# Patient Record
Sex: FEMALE | Race: BLACK OR AFRICAN AMERICAN
[De-identification: names, ages, dates, MRNs, and addresses within clinical notes are randomized per-mention and may not be internally consistent; named-entity substitution may affect disease eponyms.]

---

## 2019-12-15 NOTE — PHYS DOC
Past Medical History


Past Medical History:  No Pertinent History


Past Surgical History:  Tubal ligation


Alcohol Use:  None


Drug Use:  None





Adult General


Chief Complaint


Chief Complaint:  COUGH





HPI


HPI





Patient is a 34-year-old female who presents with complaint of productive cough 

and chest soreness. Patient states symptoms been present for close to a week 

now. She states that the cough is productive of green sputum. She denies any 

fever. She states that she is really sore in her lower ribs and her abdomen from

all the coughing. She denies any nausea or vomiting.[]





Review of Systems


Review of Systems





Constitutional: Denies fever or chills []


Respiratory: Complains of productive cough without shortness of breath []


Cardiovascular: No additional information not addressed in HPI []


GI: Denies abdominal pain, nausea, vomiting or diarrhea []





Allergies


Allergies





Allergies








Coded Allergies Type Severity Reaction Last Updated Verified


 


  No Known Drug Allergies    3/13/14 No











Physical Exam


Physical Exam





Constitutional: Well developed, well nourished, no acute distress, non-toxic 

appearance. []


Neck: Normal range of motion, no tenderness, supple, no stridor. [] 


Cardiovascular: Regular rate and rhythm[]


Lungs & Thorax: There are fine rhonchi noted to auscultation []


Extremities: No tenderness, no cyanosis, no clubbing, ROM intact, no edema. []





Current Patient Data


Vital Signs





                                   Vital Signs








  Date Time  Temp Pulse Resp B/P (MAP) Pulse Ox O2 Delivery O2 Flow Rate FiO2


 


12/15/19 15:25 98.5 99 18 130/69 (89) 97 Room Air  





 98.5       











EKG


EKG


[]





Radiology/Procedures


Radiology/Procedures


[]


Impressions:


Chest x-ray demonstrates no acute process.





Course & Med Decision Making


Course & Med Decision Making


Pertinent Labs and Imaging studies reviewed. (See chart for details)





[]





Dragon Disclaimer


Dragon Disclaimer


This electronic medical record was generated, in whole or in part, using a voice

 recognition dictation system.





Departure


Departure


Impression:  


   Primary Impression:  


   Acute bronchitis


Disposition:  01 HOME, SELF-CARE


Condition:  STABLE


Referrals:  


NO PCP (PCP)


Patient Instructions:  Acute Bronchitis


Scripts


Benzonatate (TESSALON PERLE) 100 Mg Capsule


1 CAP PO TID PRN for COUGH, #21 CAP


   Prov: DORIAN HERNANDEZ Jr. DO         12/15/19 


Azithromycin (ZITHROMAX) 250 Mg Tablet


1 PKG PO UD, #6 TAB


   Prov: DORIAN HERNANDEZ Jr. DO         12/15/19





Problem Qualifiers








   Primary Impression:  


   Acute bronchitis


   Bronchitis organism:  unspecified organism  Qualified Codes:  J20.9 - Acute 

   bronchitis, unspecified








DORIAN HERNANDEZ Jr. DO          Dec 15, 2019 15:37

## 2019-12-15 NOTE — RAD
PA and lateral chest.

 

HISTORY: Cough

 

PA and lateral views were taken of the chest. Lungs are clear. Heart is 

normal in size. There is no pleural effusion.

 

IMPRESSION:

1. No acute chest disease.

 

Electronically signed by: Steven Valdez MD (12/15/2019 4:29 PM) [id number]

## 2020-03-02 NOTE — PHYS DOC
Past Medical History


Past Medical History:  No Pertinent History


Past Surgical History:  Tubal ligation


Smoking Status:  Former Smoker


Alcohol Use:  None


Drug Use:  None





Adult General


Chief Complaint


Chief Complaint:  DENTAL PROBLEM





HPI


HPI





Patient is a 34  year old female who presents with 1 week of left upper and 

lower dental pain. Patient has many dental caries. States that she went to 

[facility] one month ago and they took x-rays and said that they can do

much for her because she has broken bones at the top of her mouth she has had 

for very long time and that she needs to go to a specialist. She states that she

called the number and left a message for the specialist dentist and they called 

back and told her that it could be $20-$25,000 to get her mouth 6. Patient rates

her pain a 9 out of 10. She denies fevers, nausea, vomiting, headache, body 

aches, facial swelling.





Review of Systems


Review of Systems








HENT: Denies nasal congestion or sore throat. Dental pain []








All other systems were reviewed and found to be within normal limits, except as 

documented in this note.





Allergies


Allergies





Allergies








Coded Allergies Type Severity Reaction Last Updated Verified


 


  No Known Drug Allergies    3/13/14 No











Physical Exam


Physical Exam





Constitutional: Well developed, well nourished, no acute distress, non-toxic 

appearance. []


HENT: Normocephalic, atraumatic, bilateral external ears normal, oropharynx 

moist, no oral exudates, nose normal. Many Dental caries all over mouth. []


Eyes: PERRLA, EOMI, conjunctiva normal, no discharge. [] 


Neck: Normal range of motion, no tenderness, supple, no stridor. [] 


Cardiovascular:Heart rate regular rhythm, no murmur []


Lungs & Thorax:  Bilateral breath sounds clear to auscultation []


Abdomen: Bowel sounds normal, soft, no tenderness, no masses, no pulsatile 

masses. [] 


Skin: Warm, dry, no erythema, no rash. [] 


Back: No tenderness, no CVA tenderness. [] 


Extremities: No tenderness, no cyanosis, no clubbing, ROM intact, no edema. [] 


Neurologic: Alert and oriented X 3, normal motor function, normal sensory 

function, no focal deficits noted. []


Psychologic: Affect normal, judgement normal, mood normal. []





Current Patient Data


Vital Signs





                                   Vital Signs








  Date Time  Temp Pulse Resp B/P (MAP) Pulse Ox O2 Delivery O2 Flow Rate FiO2


 


3/2/20 14:10 98.6 66 14 122/55 (77) 99 Room Air  





 98.6       











EKG


EKG


[]





Radiology/Procedures


Radiology/Procedures


[]





Course & Med Decision Making


Course & Med Decision Making


Pertinent Labs and Imaging studies reviewed. (See chart for details)





Has many dental caries all over her mouth. There is no gumline swelling or 

redness. No drainage and she is afebrile. She is told to keep calling the 

specialists and trying to get into his see a dentist. She states she's been 

trying Orajel and ibuprofen is not helping. Alert and oriented.  speaks in full 

clear sentences. No facial swelling. Patient states she's been eating and 

drinking appropriately. No trismus.











[]





Dragon Disclaimer


Dragon Disclaimer


This electronic medical record was generated, in whole or in part, using a voice

 recognition dictation system.





Departure


Departure


Impression:  


   Primary Impression:  


   Pain, dental


   Additional Impression:  


   Dental caries


Disposition:  01 HOME, SELF-CARE


Condition:  STABLE


Referrals:  


NO PCP (PCP)


Patient Instructions:  Dental Caries-Brief, Dental Pain, Easy-to-Read





Additional Instructions:  


Follow-up with dentist as soon as possible. Take medication as prescribed.


Scripts


Chlorhexidine Gluconate (PERIDEX) 15 Ml Mouthwash


15-30 ML PO TID for 8 Days, #473 ML 0 Refills


   Prov: LIBBY MILLER         3/2/20 


Hydrocodone/Apap 5-325 (NORCO 5-325 TABLET) 1 Each Tablet


1 TAB PO PRN Q6HRS PRN for PAIN, #10 TAB 0 Refills


   Prov: LIBBY MILLER         3/2/20





Problem Qualifiers











LIBBY MILLER             Mar 2, 2020 14:52

## 2020-03-31 NOTE — PHYS DOC
Past Medical History


Past Medical History:  No Pertinent History


Past Surgical History:  Tubal ligation


Smoking Status:  Never Smoker


Alcohol Use:  None


Drug Use:  None





Adult General


Chief Complaint


Chief Complaint:  SHORTNESS OF BREATH





HPI


HPI





Patient is a 34  year old female presents to the ED with a chief complaint of 

cough and shortness of breath.  Patient states that the symptoms started 

yesterday.  Patient states that her daughter current coronavirus pandemic she 

was concerned and came to the ER to see if she could get tested.  Patient denies

being an active smoker.  Patient denies any medical history.  Patient denies 

having a fever.





Review of Systems


Review of Systems





Patient denies fever, chills, nausea, vomiting, diarrhea, dysuria, chest pain, 

headache.  Patient does complain of cough and shortness of breath





All other systems were reviewed and found to be within normal limits, except as 

documented in this note.





Allergies


Allergies





Allergies








Coded Allergies Type Severity Reaction Last Updated Verified


 


  No Known Drug Allergies    3/13/14 No











Physical Exam


Physical Exam





Constitutional: Well developed, well nourished, no acute distress, non-toxic 

appearance. []


HENT: Normocephalic, atraumatic


Eyes: PERRLA, EOMI


Neck: Normal range of motio


Cardiovascular:Heart rate regular rhythm, no murmur []


Lungs & Thorax:  Bilateral breath sounds clear to auscultation []


Abdomen: Bowel sounds normal, soft, no tenderness


Extremities: No tenderness, no cyanosis, no clubbing, ROM intact, no edema. [] 


Neurologic: Alert and oriented X 3





Current Patient Data


Vital Signs





                                   Vital Signs








  Date Time  Temp Pulse Resp B/P (MAP) Pulse Ox O2 Delivery O2 Flow Rate FiO2


 


3/31/20 15:25 98.9 97 22 124/59 (80) 99 Room Air  





 98.9       











EKG


EKG


[]





Radiology/Procedures


Radiology/Procedures


[]





Course & Med Decision Making


Course & Med Decision Making





I told patient that we not testing patients for coronavirus unless they are 

being admitted to the hospital.  This is hospital policy.





I have offered to do chest x-ray and flu screening.





Patient states that she does not want any testing done.  She has only wanted to 

come to the ER to see if she can be tested for the coronavirus.


I have answered some questions that patient had about the coronavirus.


Patient is reassured and states that she will go home.





Patients oxygen sat is 99%.





Discussed  plan of care with patient.


Patient is instructed to follow up with PCP in one to 2 days.


Appropriate discharge instructions given to patient to return to the ED or to 

seek immediate medical evaluation.


Patient is instructed to return to the ED if symptoms worsen or if any concerns.





Dragon Disclaimer


Dragon Disclaimer


This electronic medical record was generated, in whole or in part, using a voice

 recognition dictation system.





Departure


Departure


Impression:  


   Primary Impression:  


   Viral URI with cough


Disposition:  01 HOME, SELF-CARE


Condition:  STABLE


Referrals:  


NO PCP (PCP)


Patient Instructions:  Upper Respiratory Infection, Adult





Additional Instructions:  





Patient is instructed to follow up with PCP in one to 2 days.


Appropriate discharge instructions given to patient to return to the ED or to 

seek immediate medical evaluation.


Patient is instructed to return to the ED if symptoms worsen or if any concerns.











ALCIDES PICKETT DO           Mar 31, 2020 15:46

## 2020-09-08 NOTE — RAD
Exam: Thoracic spine 2 views. Lumbar spine 2 views

 

INDICATION: Pain

 

TECHNIQUE: Frontal and lateral views of the thoracic and lumbar spine. 

Swimmer's view of the cervicothoracic junction and coned-down view of the 

lumbosacral junction were also reviewed.

 

Comparisons: None

 

FINDINGS:

 

Thoracic spine:

Vertebral body heights and alignment are well-maintained.

 

No significant spondylotic change in the thoracic spine.

 

Visualized soft tissues are unremarkable.

 

Lumbar spine:

Vertebral body heights and alignment are well-maintained.

 

Visualized soft tissues are unremarkable.

 

Mild facet arthropathy noted at the lower lumbar spine.

 

IMPRESSION:

1.  Unremarkable thoracic spine are dressed.

2.  Mild spondylotic changes lumbar spine.

 

Electronically signed by: Ciar Alatorre MD (9/8/2020 4:10 PM) [id number]

## 2020-09-08 NOTE — PHYS DOC
Past Medical History


Past Medical History:  No Pertinent History


Past Surgical History:  Tubal ligation


Smoking Status:  Never Smoker


Alcohol Use:  None


Drug Use:  None





General Adult


EDM:


Chief Complaint:  BACK PAIN OR INJURY





HPI:


HPI:





Patient is a 35  year old female who presents with a heavy metal rack fell on he

r back in May and she has had continued mid to lower back pain that worsens with

movement.  He states that she cannot get in with her work comp physician that 

her  is trying to get a hold the work comp physician.  She states that 

they want to send her to physical therapy but she cannot get a hold of anybody. 

She states that she has a court date on September 23.  She states that she ran 

out of tramadol yesterday and that is been helping her pain.  She currently 

rates her sharp aching pain 8 out of 10.





Review of Systems:


Review of Systems:


Constitutional:   Denies fever or chills. []


Eyes:   Denies change in visual acuity. []


HENT:   Denies nasal congestion or sore throat. [] 


Respiratory:   Denies cough or shortness of breath. [] 


Cardiovascular:   Denies chest pain or edema. [] 


GI:   Denies abdominal pain, nausea, vomiting, bloody stools or diarrhea. [] 


:  Denies dysuria. [] 


Musculoskeletal:   Mid to lower back pain or joint pain. [] 


Integument:   Denies rash. [] 


Neurologic:   Denies headache, focal weakness or sensory changes.  Intermittent 

right hand tingling or numbness.  [] 


Endocrine:   Denies polyuria or polydipsia. [] 


Lymphatic:  Denies swollen glands. [] 


Psychiatric:  Denies depression or anxiety. []





Heart Score:


Risk Factors:


Risk Factors:  DM, Current or recent (<one month) smoker, HTN, HLP, family 

history of CAD, obesity.


Risk Scores:


Score 0 - 3:  2.5% MACE over next 6 weeks - Discharge Home


Score 4 - 6:  20.3% MACE over next 6 weeks - Admit for Clinical Observation


Score 7 - 10:  72.7% MACE over next 6 weeks - Early Invasive Strategies





Allergies:


Allergies:





Allergies








Coded Allergies Type Severity Reaction Last Updated Verified


 


  No Known Drug Allergies    3/13/14 No











Physical Exam:


PE:





Constitutional: Well developed, well nourished, no acute distress, non-toxic 

appearance. []


HENT: Normocephalic, atraumatic, bilateral external ears normal, oropharynx 

moist, no oral exudates, nose normal. []


Eyes: PERRLA, EOMI, conjunctiva normal, no discharge. [] 


Neck: Normal range of motion, no tenderness, supple, no stridor. [] 


Cardiovascular:Heart rate regular rhythm, no murmur []


Lungs & Thorax:  Bilateral breath sounds clear to auscultation []


Abdomen: Bowel sounds normal, soft, no tenderness, no masses, no pulsatile 

masses. [] 


Skin: Warm, dry, no erythema, no rash. [] 


Back: Mid to upper lumbar focal bony spinal tenderness, no CVA tenderness. [] 


Extremities: No tenderness, no cyanosis, no clubbing, ROM intact, no edema. [] 


Neurologic: Alert and oriented X 3, normal motor function, normal sensory 

function, no focal deficits noted. []


Psychologic: Affect normal, judgement normal, mood normal. []





EKG:


EKG:


[]





Radiology/Procedures:


Radiology/Procedures:


[]





Course & Med Decision Making:


Course & Med Decision Making


Pertinent Labs and Imaging studies reviewed. (See chart for details)





See HPI.  Ambulatory with a steady gait.  No saddle paresthesia.  Speaks in full

 complete sentences.  Alert and oriented x4.  Patient states she only has 

numbness and tingling in her mid leg since the incident and her right hand.  

Patient denies any numbness and tingling in her bilateral lower extremities, 

loss of bowel bladder, dizziness, headache, fever, dysuria symptoms, chest pain,

 shortness of breath, abdominal pain, nausea, vomiting, diarrhea.  Skin pink 

warm and dry.  Radial pulses strong present.  Bilateral lower extremities and 

upper extremities have equal strengths and .  No extremity edema.  Patient 

states when her right hand intermittently goes numb or starts tingling that is 

when she loses some strength in the hand.  With palpation patient has focal bony

 spinal tenderness through the thoracic down into the upper lumbar areas.  There

 is no bruising, deformity, swelling seen or felt.  Neurologically intact.





Xrays read by Dr Vallejo as no acute findings. Patient to follow up with work 

compensation doctor. 





[]





Dragon Disclaimer:


Dragon Disclaimer:


This electronic medical record was generated, in whole or in part, using a voice

 recognition dictation system.





Departure


Departure


Impression:  


   Primary Impression:  


   Back pain


   Qualified Codes:  M54.5 - Low back pain


   Additional Impression:  


   Numbness and tingling in right hand


Disposition:  01 HOME, SELF-CARE


Condition:  STABLE


Referrals:  


NO PCP (PCP)


Patient Instructions:  Back Pain, Adult





Additional Instructions:  


Follow-up with the work comp physician as soon as possible.  Take medication as 

prescribed do not drink alcohol or do other drugs with this medication.


Scripts


Tramadol Hcl (TRAMADOL HCL) 50 Mg Tablet


50 MG PO Q6HRS PRN for PAIN, #10 TAB


   Prov: LIBBY MILLER         9/8/20





Justicifation of Admission Dx:


Justifications for Admission:


Justification of Admission Dx:  N/A











LIBBY MILLER             Sep 8, 2020 14:26

## 2020-09-08 NOTE — RAD
Exam: Thoracic spine 2 views. Lumbar spine 2 views

 

INDICATION: Pain

 

TECHNIQUE: Frontal and lateral views of the thoracic and lumbar spine. 

Swimmer's view of the cervicothoracic junction and coned-down view of the 

lumbosacral junction were also reviewed.

 

Comparisons: None

 

FINDINGS:

 

Thoracic spine:

Vertebral body heights and alignment are well-maintained.

 

No significant spondylotic change in the thoracic spine.

 

Visualized soft tissues are unremarkable.

 

Lumbar spine:

Vertebral body heights and alignment are well-maintained.

 

Visualized soft tissues are unremarkable.

 

Mild facet arthropathy noted at the lower lumbar spine.

 

IMPRESSION:

1.  Unremarkable thoracic spine are dressed.

2.  Mild spondylotic changes lumbar spine.

 

Electronically signed by: Cira Alatorre MD (9/8/2020 4:10 PM) [id number]

## 2021-05-28 VITALS — SYSTOLIC BLOOD PRESSURE: 128 MMHG | DIASTOLIC BLOOD PRESSURE: 68 MMHG

## 2021-05-28 NOTE — EKG
[facility]

              [address] [place] [address]

Test Date:    2021               Test Time:    07:43:31

Pat Name:     GRETA DESIR            Department:   

Patient ID:   [id number]           Room:          

Gender:       F                        Technician:   

:          [id number]               Requested By: ANGELA YOUSSEF

Order Number: [id number]           Reading MD:     

                                 Measurements

Intervals                              Axis          

Rate:         96                       P:            79

WV:           158                      QRS:          73

QRSD:         78                       T:            50

QT:           348                                    

QTc:          441                                    

                           Interpretive Statements

SINUS RHYTHM

NORMAL ECG

RI6.02

No previous ECG available for comparison

## 2021-05-28 NOTE — PHYS DOC
Past Medical History


Past Medical History:  No Pertinent History


Past Surgical History:  Tubal ligation


Smoking Status:  Current Every Day Smoker


Alcohol Use:  Rarely


Drug Use:  None





General Adult


EDM:


Chief Complaint:  SUBSTANCE ABUSE





HPI:


HPI:


35-year-old -American female presents the ED past medical history of 

former alcoholism, complaints of nausea, nonbloody nonbilious vomiting and 

epigastric abdominal pain that started a few hours prior to arrival.  Patient 

reports her sister was recently killed and she relapsed, drank a bottle of 

champagne by herself, last drink at 3 AM.  States her abdominal pain feels like 

her prior history of pancreatitis (6 yrs ago). Is crying, stating "my son was so

made at me," for drinking alcohol.  Takes no routinely prescribed medications.  

History of tubal ligation.  Denies any other drug use.  Denies any HI or SI.





Review of Systems:


Review of Systems:


Constitutional:   Denies fever or chills. []


Eyes:   Denies change in visual acuity. []


HENT:   Denies nasal congestion or sore throat. [] 


Respiratory:   Denies cough or shortness of breath. [] 


Cardiovascular:   Denies chest pain or edema. [] 


GI:   Denies bloody stools or diarrhea. [] 


:  Denies dysuria or vaginal bleeding 


Musculoskeletal:   Denies back pain or joint pain. [] 


Integument:   Denies rash or diaphoresis 


Neurologic:   Denies headache, focal weakness or sensory changes. [] 


Endocrine:   Denies polyuria or polydipsia. [] 


Lymphatic:  Denies swollen glands. [] 


Psychiatric:  Denies depression or anxiety. []





Heart Score:


C/O Chest Pain:  No


Risk Factors:


Risk Factors:  DM, Current or recent (<one month) smoker, HTN, HLP, family 

history of CAD, obesity.


Risk Scores:


Score 0 - 3:  2.5% MACE over next 6 weeks - Discharge Home


Score 4 - 6:  20.3% MACE over next 6 weeks - Admit for Clinical Observation


Score 7 - 10:  72.7% MACE over next 6 weeks - Early Invasive Strategies





Current Medications:





Current Medications








 Medications


  (Trade)  Dose


 Ordered  Sig/Guido  Start Time


 Stop Time Status Last Admin


Dose Admin


 


 Hydromorphone HCl


  (Dilaudid)  0.5 mg  1X  ONCE  21 07:45


 21 07:46 UNV  





 


 Metoclopramide HCl


  (Reglan Vial)  10 mg  1X  ONCE  21 07:45


 21 07:46 UNV  





 


 Ondansetron HCl


  (Zofran Odt)  4 mg  1X  ONCE  21 07:30


 21 07:31 DC  





 


 Sodium Chloride  1,000 ml @ 


 1,000 mls/hr  1X  ONCE  21 07:45


 21 08:44   














Allergies:


Allergies:





Allergies








Coded Allergies Type Severity Reaction Last Updated Verified


 


  No Known Drug Allergies    3/13/14 No











Physical Exam:


PE:





Constitutional: Well developed, well nourished, no acute distress, non-toxic 

appearance. 


HENT: Normocephalic, atraumatic, moist mucous membranes


Eyes: EOMI, conjunctiva normal, no discharge.  


Neck: Normal range of motion,  supple, 


Cardiovascular: S1/2 present, regular rhythm


Lungs & Thorax: Speaking in full sentences, bilateral equal chest rise, no 

tachypnea or increased work of breathing


Abdomen:  soft, epigastric ttp, no rigidity or guarding, skin flap/has lost 

weight, active yellow emesis in basin


Skin: Warm, dry, no erythema, no rash. [] 


Back: No tenderness, no CVA tenderness. [] 


Extremities: No tenderness, no cyanosis, no lower extremity edema


Neurologic: Alert and oriented X 3, normal motor function, normal sensory 

function, no focal deficits noted. []


Psychologic: Affect normal, judgement normal, mood-tearful/very hard on herself





Current Patient Data:


Labs:





                                Laboratory Tests








Test


 21


07:30


 


POC Urine HCG, Qualitative


 Hcg negative


(Negative)








Vital Signs:





                                   Vital Signs








  Date Time  Temp Pulse Resp B/P (MAP) Pulse Ox O2 Delivery O2 Flow Rate FiO2


 


21 07:38 98.4 90 22 126/75 (92) 100 Room Air  





 98.4       











EKG:


EKG:


sinus rhythm 96bpm, NAD, normal intervals, no T wave inversion, no ST elevations

 or ST depressions, no active chest pain





Radiology/Procedures:


Radiology/Procedures:


                                        


                                 IMAGING REPORT





                                     Signed





PATIENT: GRETA DESIR  ACCOUNT: [id number]     MRN#: [id number]


: 1985           LOCATION: ER              AGE: 35


SEX: F                    EXAM DT: 21         ACCESSION#: [id number]


STATUS: REG ER            ORD. PHYSICIAN: ANGELA YOUSSEF DO


REASON: epigastric pain, etoh use, h/o pancreatitis


PROCEDURE: CT ABD PELV W/ IV CONTRST ONLY








EXAM: Abdomen and pelvis CT with intravenous contrast.





HISTORY: Epigastric pain.





TECHNIQUE: Computed tomographic images of the abdomen and pelvis were obtained 

following the administration of intravenous contrast. Multiplanar reformatting 

was performed.





*One or more of the following individualized dose reduction techniques were 

utilized for this examination:  


1. Automated exposure control.  


2. Adjustment of the mA and/or kV according to patient size.  


3. Use of iterative reconstruction technique.





COMPARISON: 3/10/2018.





FINDINGS: Evaluation of the lower thorax demonstrates no infiltrate or pleural 

effusion. The heart is normal in size. There is mild fatty infiltration of the 

liver along the falciform ligament. No suspicious hepatic lesion is seen. The 

gallbladder is unremarkable. The pancreas, spleen, stomach and adrenal glands 

are unremarkable. The kidneys are unremarkable. There is no evidence of 

appendicitis. There is mild colonic wall thickening likely due to relative under

 distention. There is no convincing acute colitis. There is no bowel 

obstruction. The aorta is normal in caliber. The bladder, uterus and adnexal 

regions are unremarkable. There is no suspicious or acute osseous finding.





IMPRESSION: No convincing acute abdominal or pelvic finding.





Electronically signed by: Rosalie Valdez MD (2021 8:38 AM) [id number]














DICTATED and SIGNED BY:     ROSALIE VALDEZ MD


DATE:     21 [id number] 0





Course & Med Decision Making:


Course & Med Decision Making


Pertinent Labs and Imaging studies reviewed. (See chart for details)





Concern for nausea and vomiting w/epigastric abdominal pain. Normal lipase and 

pancreas (and aorta) on CT imaging. U/A c/w trichomonas infection. Patient with 

no leukocytosis, does not meet sepsis criteria. Urinalysis is contaminated with 

hematuria. Patient has had tubal ligation and is on her menses. 1 male 

unprotected sexual partner "baby aparna," 2 months ago.  Patient denies any 

abnormal vaginal discharge, itching or odor, lower abdominal pelvic or low back 

pain.  No associated fever, myalgias, nausea, vomiting or flulike symptoms.  

Patient was treated in the ED with antiemetics, Haldol for cannabis hyperemesis 

syndrome.  Abdominal pain and NBNB emesis resolved. GC self swab sent. Rocephin 

in ed, doxycycline and flagyl rx-educated to inform & treat all sexual partners.

  Will discharge home with strict ED return precautions were given for flulike 

symptoms, intractable nausea or vomiting, body aches, nausea, vomiting or 

worsening pain/abnormal vaginal discharge. Encouraged urgent outpatient follow-

up with PMD and OB/GYN for definitive management of STI (perform blood-borne 

testing).  Life-threatening processes were considered but are low suspicion at 

this time, given history, physical exam and ED workup. Pt was educated on all 

prescription medications and adverse effects.  All patient's questions were 

answered and pt was stable at time of discharge.





Life/limb-threatening differential includes but is not limited to, aortic 

dissection, aortic aneurysm, acute coronary syndrome, surgical abdomen 

(appendicitis, cholecystitis, ischemic bowel, strangulated hernia, etc), bowel 

obstruction or volvulus, bladder outlet obstruction, gastrointestinal bleeding, 

inflammatory bowel disease, peptic ulcer disease, ACS/CAD, sepsis, diverticular 

disease, ureterolithiasis,  nephrolithiasis, ovarian or testicular torsion, 

ectopic pregnancy, vaginal hemorrhage, or genitourinary infection.





I spoken with the patient and her caregivers.  I explained the patient's 

condition, diagnoses and treatment plan based on the information available to me

 at this time.  I have answered the patient and her caregiver's questions and 

addressed any concerns.  The patient and her caregivers have a good 

understanding of patient's diagnosis, condition and treatment plan as can be 

expected at this point.  Vital signs have been stable.  Patient's condition is 

stable and appropriate for discharge from the emergency department. 





Patient will pursue further outpatient evaluation with primary care physician or

 other designated or consulting physician as outlined in the discharge 

instructions.  The patient and/or caregivers are agreeable to this plan of care 

and follow-up instructions have been explained in detail.  The patient and/or 

caregivers have received these instructions in written form and have expressed 

an understanding of the discharge instructions.  The patient and/or caregivers 

are aware that any significant change of condition or worsening of symptoms 

should prompt immediate return to this or the closest emergency department or 

call to 911.





Jordan Disclaimer:


Dragon Disclaimer:


This electronic medical record was generated, in whole or in part, using a voice

 recognition dictation system.





Departure


Departure


Impression:  


   Primary Impression:  


   Nausea and vomiting


   Additional Impressions:  


   Abdominal pain


   Trichomonas infection


Disposition:  01 HOME / SELF CARE / HOMELESS


Condition:  STABLE


Referrals:  


NO PCP (PCP)


Patient Instructions:  Nausea and Vomiting, Safe Sex, Trichomoniasis





Additional Instructions:  


FOLLOW UP WITH  OB/GYN: For definitive management


[facility] Obstetrics and Gynecology


[address]


[place] [address]


Phone: [phone]





EMERGENCY DEPARTMENT GENERAL DISCHARGE INSTRUCTIONS





Thank you for coming to [facility] Emergency Department (ED) lina srinivasan and 


trusting us with you care.  We trust that you had a positive experience in our 

Emergency 


Department.  If you wish to speak to the department management, you may call the

 Director at 


[phone].





YOUR FOLLOW UP INSTRUCTIONS ARE AS FOLLOWS:





1.  Do you have a private Doctor?  If you do not have a private doctor, please 

ask for a 


resource list of physicians or clinics that may be able to assist you with 

follow up care.





2.  The Emergency Physicain has interpreted your x-rays.  The X-Ray specialist 

will also 


review them.  If there is a change in the findings, you will be notified in 48 

hours when at 


all possible.





3.  A lab test or culture has been done, your results will be reviewed and you 

will be 


notified if you need a change in treatment.





ADDITIONAL INSTRUCTIONS AND INFORMATION:





1.  Your care today has been supervised by a physician who is specially trained 

in emergency 


care.  Many problems require more than one evaluation for a complete diagnosis 

and 


treatment.  We recommend that you schedule your follow up appointment as 

recommended to 


ensure complete treatment of you illness or injury.  If you are unable to obtain

 follow up 


care and continue to have a problem, or if your condition worsens, we recommend 

that you 


return to the ED.





2.  We are not able to safely determine your condition over the phone nor are we

 able to 


give sound medical advice over the phone.  For these safety reasons, if you call

 for medical 


advice we will ask you to come to the ED for further evaluation.





3.  If you have any questions regarding these discharge instructions please call

 the ED at 


[phone].





SAFETY INFORMATION:





In the interest of safety, wellness, and injury prevention; we encourage you to 

wear your 


sealbelt, if you smoke; quite smoking, and we encourage family to use a 

protective helmet 


for bicycling and other sporting events that present an increased risk for head 

injury.





IF YOUR SYMPTOMS WORSEN OR NEW SYMPTOMS DEVELOP, OR YOU HAVE CONCERNS ABOUT YOUR

 CONDITION; 


OR IF YOUR CONDITION WORSENS WHILE YOU ARE WAITING FOR YOUR FOLLOW UP 

APPOINTMENT; EITHER 


CONTACT YOUR PRIMARY CARE DOCTOR, THE PHYSICIAN WHOSE NAME AND NUMBER YOU WERE 

GIVEN, OR 


RETURN TO THE ED IMMEDIATELY.


Scripts


Metronidazole (FLAGYL) 500 Mg Tablet


1 TAB PO BID for 14 Days, #28 TAB


   Prov: ANGELA YOUSSEF DO         21 


Ondansetron (ONDANSETRON ODT) 4 Mg Tab.rapdis


1 TAB PO PRN Q6-8HRS, #20 TAB


   Prov: ANGELA YOUSSEF DO         21 


Doxycycline Hyclate (DOXYCYCLINE HYCLATE) 100 Mg Capsule


1 CAP PO BID for 10 Days, #20 CAP


   Prov: ANGELA YOUSSEF DO         21











ANGELA YOUSSEF DO               May 28, 2021 07:52

## 2021-05-28 NOTE — RAD
EXAM: Abdomen and pelvis CT with intravenous contrast.



HISTORY: Epigastric pain.



TECHNIQUE: Computed tomographic images of the abdomen and pelvis were obtained following the administ
ration of intravenous contrast. Multiplanar reformatting was performed.



*One or more of the following individualized dose reduction techniques were utilized for this examina
tion:  

1. Automated exposure control.  

2. Adjustment of the mA and/or kV according to patient size.  

3. Use of iterative reconstruction technique.



COMPARISON: 3/10/2018.



FINDINGS: Evaluation of the lower thorax demonstrates no infiltrate or pleural effusion. The heart is
 normal in size. There is mild fatty infiltration of the liver along the falciform ligament. No suspi
cious hepatic lesion is seen. The gallbladder is unremarkable. The pancreas, spleen, stomach and adre
nal glands are unremarkable. The kidneys are unremarkable. There is no evidence of appendicitis. Ther
e is mild colonic wall thickening likely due to relative under distention. There is no convincing acu
te colitis. There is no bowel obstruction. The aorta is normal in caliber. The bladder, uterus and ad
nexal regions are unremarkable. There is no suspicious or acute osseous finding.



IMPRESSION: No convincing acute abdominal or pelvic finding.



Electronically signed by: Rosalie Narvaez MD (5/28/2021 8:38 AM) [id number]

## 2021-09-09 ENCOUNTER — HOSPITAL ENCOUNTER (EMERGENCY)
Dept: HOSPITAL 63 - ER | Age: 36
Discharge: HOME | End: 2021-09-09
Payer: COMMERCIAL

## 2021-09-09 VITALS — DIASTOLIC BLOOD PRESSURE: 97 MMHG | SYSTOLIC BLOOD PRESSURE: 116 MMHG

## 2021-09-09 VITALS — BODY MASS INDEX: 28.77 KG/M2 | HEIGHT: 66 IN | WEIGHT: 179.02 LBS

## 2021-09-09 DIAGNOSIS — J18.9: Primary | ICD-10-CM

## 2021-09-09 DIAGNOSIS — Z98.51: ICD-10-CM

## 2021-09-09 DIAGNOSIS — Z20.822: ICD-10-CM

## 2021-09-09 LAB
ANION GAP SERPL CALC-SCNC: 6 MMOL/L (ref 6–14)
BASOPHILS # BLD AUTO: 0.1 X10^3/UL (ref 0–0.2)
BASOPHILS NFR BLD: 1 % (ref 0–3)
CA-I SERPL ISE-MCNC: 12 MG/DL (ref 7–20)
CALCIUM SERPL-MCNC: 8.5 MG/DL (ref 8.5–10.1)
CHLORIDE SERPL-SCNC: 105 MMOL/L (ref 98–107)
CO2 SERPL-SCNC: 28 MMOL/L (ref 21–32)
CREAT SERPL-MCNC: 0.5 MG/DL (ref 0.6–1)
EOSINOPHIL NFR BLD: 0.2 X10^3/UL (ref 0–0.7)
EOSINOPHIL NFR BLD: 2 % (ref 0–3)
ERYTHROCYTE [DISTWIDTH] IN BLOOD BY AUTOMATED COUNT: 14.2 % (ref 11.5–14.5)
GFR SERPLBLD BASED ON 1.73 SQ M-ARVRAT: 169.9 ML/MIN
GLUCOSE SERPL-MCNC: 95 MG/DL (ref 70–99)
HCT VFR BLD CALC: 34.1 % (ref 36–47)
HGB BLD-MCNC: 10.9 G/DL (ref 12–15.5)
LYMPHOCYTES # BLD: 2.1 X10^3/UL (ref 1–4.8)
LYMPHOCYTES NFR BLD AUTO: 16 % (ref 24–48)
MCH RBC QN AUTO: 29 PG (ref 25–35)
MCHC RBC AUTO-ENTMCNC: 32 G/DL (ref 31–37)
MCV RBC AUTO: 90 FL (ref 79–100)
MONO #: 1.5 X10^3/UL (ref 0–1.1)
MONOCYTES NFR BLD: 11 % (ref 0–9)
NEUT #: 9.3 X10^3UL (ref 1.8–7.7)
NEUTROPHILS NFR BLD AUTO: 70 % (ref 31–73)
PLATELET # BLD AUTO: 367 X10^3/UL (ref 140–400)
POTASSIUM SERPL-SCNC: 4.2 MMOL/L (ref 3.5–5.1)
RBC # BLD AUTO: 3.81 X10^6/UL (ref 3.5–5.4)
SODIUM SERPL-SCNC: 139 MMOL/L (ref 136–145)
WBC # BLD AUTO: 13.3 X10^3/UL (ref 4–11)

## 2021-09-09 PROCEDURE — 80048 BASIC METABOLIC PNL TOTAL CA: CPT

## 2021-09-09 PROCEDURE — U0003 INFECTIOUS AGENT DETECTION BY NUCLEIC ACID (DNA OR RNA); SEVERE ACUTE RESPIRATORY SYNDROME CORONAVIRUS 2 (SARS-COV-2) (CORONAVIRUS DISEASE [COVID-19]), AMPLIFIED PROBE TECHNIQUE, MAKING USE OF HIGH THROUGHPUT TECHNOLOGIES AS DESCRIBED BY CMS-2020-01-R: HCPCS

## 2021-09-09 PROCEDURE — 85025 COMPLETE CBC W/AUTO DIFF WBC: CPT

## 2021-09-09 PROCEDURE — 71045 X-RAY EXAM CHEST 1 VIEW: CPT

## 2021-09-09 PROCEDURE — C9803 HOPD COVID-19 SPEC COLLECT: HCPCS

## 2021-09-09 PROCEDURE — 84484 ASSAY OF TROPONIN QUANT: CPT

## 2021-09-09 PROCEDURE — 99284 EMERGENCY DEPT VISIT MOD MDM: CPT

## 2021-09-09 NOTE — PHYS DOC
Past History


Past Surgical History:  Tubal ligation


Alcohol Use:  None





General Adult


EDM:


Chief Complaint:  COUGH





HPI:


HPI:


Patient is a 36-year-old female who presents to the ER for a yellow productive 

cough, nasal congestion/drainage and shortness of breath.  Patient is also 

reporting chest wall pain with cough and deep inspiration.  She denies any loss 

of taste or smell, fevers, sick exposures.  She is not vaccinated for COVID-19. 

She has no medical history.  Patient's vital signs are stable and she is in no 

acute distress at this time.





Review of Systems:


Review of Systems:


14 body systems of the review of systems have been reviewed.  See HPI for 

pertinent positive and negative responses, otherwise all other systems are 

negative, nonpertinent or noncontributory





Allergies:


Allergies:





Allergies








Coded Allergies Type Severity Reaction Last Updated Verified


 


  No Known Drug Allergies    9/9/21 No











Physical Exam:


PE:





Constitutional: Well developed, well nourished, no acute distress, non-toxic 

appearance. []


HENT: Normocephalic, atraumatic, bilateral external ears normal, oropharynx 

moist, no oral exudates, nose normal. []


Eyes: PERRLA, EOMI, conjunctiva normal, no discharge. [] 


Neck: Normal range of motion, no tenderness, supple, no stridor. [] 


Cardiovascular:Heart rate regular rhythm, no murmur []


Lungs & Thorax:  Bilateral breath sounds clear to auscultation []


Abdomen: Bowel sounds normal, soft, no tenderness, no masses, no pulsatile 

masses. [] 


Skin: Warm, dry, no erythema, no rash. [] 


Back: No tenderness, no CVA tenderness. [] 


Extremities: No tenderness, no cyanosis, no clubbing, ROM intact, no edema. [] 


Neurologic: Alert and oriented X 3, normal motor function, normal sensory 

function, no focal deficits noted. []


Psychologic: Affect normal, judgement normal, mood normal. []





Current Patient Data:


Labs:





Laboratory Tests








Test


 9/9/21


10:17


 


White Blood Count 13.3 x10^3/uL 


 


Red Blood Count 3.81 x10^6/uL 


 


Hemoglobin 10.9 g/dL 


 


Hematocrit 34.1 % 


 


Mean Corpuscular Volume 90 fL 


 


Mean Corpuscular Hemoglobin 29 pg 


 


Mean Corpuscular Hemoglobin


Concent 32 g/dL 





 


Red Cell Distribution Width 14.2 % 


 


Platelet Count 367 x10^3/uL 


 


Neutrophils (%) (Auto) 70 % 


 


Lymphocytes (%) (Auto) 16 % 


 


Monocytes (%) (Auto) 11 % 


 


Eosinophils (%) (Auto) 2 % 


 


Basophils (%) (Auto) 1 % 


 


Neutrophils # (Auto) 9.3 x10^3uL 


 


Lymphocytes # (Auto) 2.1 x10^3/uL 


 


Monocytes # (Auto) 1.5 x10^3/uL 


 


Eosinophils # (Auto) 0.2 x10^3/uL 


 


Basophils # (Auto) 0.1 x10^3/uL 


 


Sodium Level 139 mmol/L 


 


Potassium Level 4.2 mmol/L 


 


Chloride Level 105 mmol/L 


 


Carbon Dioxide Level 28 mmol/L 


 


Anion Gap 6 


 


Blood Urea Nitrogen 12 mg/dL 


 


Creatinine 0.5 mg/dL 


 


Estimated GFR


(Cockcroft-Gault) 169.9 





 


Glucose Level 95 mg/dL 


 


Calcium Level 8.5 mg/dL 


 


Troponin I Quantitative < 0.017 ng/mL 








Vital Signs:





                                   Vital Signs








  Date Time  Temp Pulse Resp B/P (MAP) Pulse Ox O2 Delivery O2 Flow Rate FiO2


 


9/9/21 09:52 97.8 76 16 116/97 100 Room Air  











EKG:


EKG:


[]





Radiology/Procedures:


Radiology/Procedures:


[]PROCEDURE: PORTABLE CHEST 1V








INDICATION: Reason: soa, cough, PUI / Spl. Instructions:  / History: 





COMPARISON: None.





FINDINGS:





2 view of chest obtained.


There is a curvilinear metallic bar projecting over the mediastinum. This could 

be from jewelry in the region given that this was also seen on prior thoracic 

spine radiograph from September 2020.


Mild haziness at the bilateral lung bases left greater than right without 

consolidation elsewhere in the lungs








IMPRESSION:





*  Hazy opacity at left greater than right lung base. At least a portion of this

 is likely secondary to overlap of soft tissue structures but superimposed 

atelectasis or mild infiltrate from infectious etiology could have this 

appearance. Would correlate with symptoms.





Electronically signed by: Susanne Rao MD (9/9/2021 11:20 AM) [id number]














DICTATED AND SIGNED BY:     SUSANNE RAO MD


DATE:     09/09/21 1117





CC: LITA OWENS APRN; PCP,NO ~MTH0 0





Heart Score:


C/O Chest Pain:  No


Risk Factors:


Risk Factors:  DM, Current or recent (<one month) smoker, HTN, HLP, family 

history of CAD, obesity.


Risk Scores:


Score 0 - 3:  2.5% MACE over next 6 weeks - Discharge Home


Score 4 - 6:  20.3% MACE over next 6 weeks - Admit for Clinical Observation


Score 7 - 10:  72.7% MACE over next 6 weeks - Early Invasive Strategies





Course & Med Decision Making:


Course & Med Decision Making


Pertinent Labs and Imaging studies reviewed. (See chart for details)





[] Patient is a 36-year-old female being seen in the ER for cough, nasal c

ongestion/drainage, shortness of breath and chest wall pain with cough/deep 

inspiration.  Patient's vital signs are stable.  She is not tachycardic or 

hypoxic.  Work-up in the ER consisted of blood work, chest x-ray, Covid testing.

  Patient will be notified of Covid test results when they become available in 

approximately 2 days.  Patient advised to self isolate until she receives these 

results.  Chest x-ray showed atelectasis versus infiltrate to the left lung base

 greater than the right..  Blood work was unremarkable other than leukocytosis. 

 It is likely that patient has a COVID-19 infection.  Patient will be treated 

with an antibiotic.  I discussed with patient all findings and diagnostic 

testing as well as the need to follow-up with PCP for further evaluation and 

treatment or return to the ER if any new or worsening symptoms.  Strict return 

precautions were also discussed at length.  Patient voiced understanding and 

agreement with the plan.  Patient is hemodynamically stable at the time of 

disposition.





Dragon Disclaimer:


Dragon Disclaimer:


This electronic medical record was generated, in whole or in part, using a voice

 recognition dictation system.





Departure


Departure:


Impression:  


   Primary Impression:  


   Person under investigation for COVID-19


   Additional Impression:  


   Pneumonia


   Qualified Codes:  J18.9 - Pneumonia, unspecified organism


Disposition:  01 HOME / SELF CARE / HOMELESS


Condition:  GOOD


Referrals:  


PCP,REBECCA (PCP)


Patient Instructions:  Cough, Adult





Additional Instructions:  


You were seen in the ER today for cough, shortness of breath, nasal congestion. 

 The symptoms are consistent with a COVID-19 viral infection.  You were tested 

in the ER today.  You will be notified of those results when they become 

available in approximately 2 days.  Please self isolate until you receive these 

results.  Your chest x-ray showed possible pneumonia in your lung bases.  You 

are being discharged home with an antibiotic to treat this.  Please start and 

finish it completely.  For your shortness of breath you are being discharged 

home with a inhaler. Please use this as directed for shortness of breath.  For 

any pain or fevers you can take Tylenol/ibuprofen.  For your cough you can take 

Delsym over-the-counter.  Mucinex may help your nasal congestion.  Increase your

 fluids.  Please follow-up with your primary care provider tomorrow regarding 

your ER visit.  If you develop worsening of your shortness of breath, chest 

pain, high fevers refractory to treatment, lightheadedness, intractable 

nausea/vomiting please return to the ER.





EMERGENCY DEPARTMENT GENERAL DISCHARGE INSTRUCTIONS





Thank you for coming to [facility] (ED) today and trusting us

 with you 


care.  We trust that you had a positivie experience in our Emergency Department.

  If you 


wish to speak to the department management, you may call the director at 

[phone].





YOUR FOLLOW UP INSTRUCTIONS ARE AS FOLLOWS:





1.  Do you have a private Doctor?  If you do not have a private doctor, please 

ask for a 


resource list of physicians or clinics that may be able to assist you with 

follow up care.





2.  The Emergency Physician has interpreted your x-rays.  The X-Ray specialist 

will also 


review them.  If there is a change in the findings, you will be notified in 48 

hours when at 


all possible.





3.  A lab test or culture has been done, your results will be reviewed and you 

will be 


notified if you need a change in treatment.





ADDITIONAL INSTRUCTIONS AND INFORMATION:





1.  Your care today has been supervised by a physician who is specially trained 

in emergency 


care.  Many problems require more than one evaluation for a complete diagnosis 

and 


treatment.  We recommend that you schedule your follow up appointment as 

recommended to 


ensure complete treatment of you illness or injury.  If you are unable to obtain

 follow up 


care and continue to have a problem, or if your condition worsens, we recommend 

that you 


return to the ED.





2.  We are not able to safely determine your condition over the phone nor are we

 able to 


give sound medical advice over the phone.  For these safety reasons, if you call

 for medical 


advice we will ask you to come to the ED for further evaluation.





3.  If you have any questions regarding these discharge instructions please call

 the ED at 


[phone].





SAFETY INFORMATION:





In the interest of safety, wellness, and injury prevention; we encourage you to 

wear your 


sealbelt, if you smoke; quite smoking, and we encourage family to use a 

protective helmet 


for bicycling and other sporting events that present an increased risk for head 

injury.





IF YOUR SYMPTOMS WORSEN OR NEW SYMPTOMS DEVELOP, OR YOU HAVE CONCERNS ABOUT YOUR

 CONDITION; 


OR IF YOUR CONDITION WORSENS WHILE YOU ARE WAITING FOR YOUR FOLLOW UP 

APPOINTMENT; EITHER 


CONTACT YOUR PRIMARY CARE DOCTOR, THE PHYSICIAN WHOSE NAME AND NUMBER YOU WERE 

GIVEN, OR 


RETURN TO THE ED IMMEDIATELY.


Scripts


Albuterol Sulfate (PROAIR HFA INHALER) 8.5 Gm Hfa.aer.ad


2 PUFF IH PRN Q4-6HRS PRN for wheezing for 21 Days, #1 INHALER 0 Refills


   as needed for wheezing


   Prov: LITA OWENS         9/9/21 


Azithromycin (AZITHROMYCIN TABLET) 250 Mg Tablet


1 PKG PO UD for pneumonia for 5 Days, #6 TAB 0 Refills


   2 the first day followed by 1 for days 2-5


   Prov: LITA OWENS         9/9/21











LITA OWENS           Sep 9, 2021 10:32

## 2021-09-09 NOTE — RAD
INDICATION: Reason: soa, cough, PUI / Spl. Instructions:  / History: 



COMPARISON: None.



FINDINGS:



2 view of chest obtained.

There is a curvilinear metallic bar projecting over the mediastinum. This could be from jewelry in th
e region given that this was also seen on prior thoracic spine radiograph from September 2020.

Mild haziness at the bilateral lung bases left greater than right without consolidation elsewhere in 
the lungs





IMPRESSION:



*  Hazy opacity at left greater than right lung base. At least a portion of this is likely secondary 
to overlap of soft tissue structures but superimposed atelectasis or mild infiltrate from infectious 
etiology could have this appearance. Would correlate with symptoms.



Electronically signed by: Mina Rao MD (9/9/2021 11:20 AM) [id number]

## 2021-09-29 ENCOUNTER — HOSPITAL ENCOUNTER (EMERGENCY)
Dept: HOSPITAL 61 - ER | Age: 36
Discharge: LEFT BEFORE DISCHARGE | End: 2021-09-29
Payer: COMMERCIAL

## 2021-09-29 DIAGNOSIS — Z53.21: ICD-10-CM

## 2021-09-29 DIAGNOSIS — R09.81: ICD-10-CM

## 2021-09-29 DIAGNOSIS — R05: Primary | ICD-10-CM
